# Patient Record
Sex: MALE | Race: BLACK OR AFRICAN AMERICAN
[De-identification: names, ages, dates, MRNs, and addresses within clinical notes are randomized per-mention and may not be internally consistent; named-entity substitution may affect disease eponyms.]

---

## 2019-04-05 ENCOUNTER — HOSPITAL ENCOUNTER (INPATIENT)
Dept: HOSPITAL 97 - ER | Age: 54
LOS: 5 days | Discharge: NURSING, REHAB OR LONG-TERM CARE | DRG: 540 | End: 2019-04-10
Attending: FAMILY MEDICINE | Admitting: INTERNAL MEDICINE
Payer: COMMERCIAL

## 2019-04-05 DIAGNOSIS — E11.610: ICD-10-CM

## 2019-04-05 DIAGNOSIS — F17.210: ICD-10-CM

## 2019-04-05 DIAGNOSIS — E11.51: ICD-10-CM

## 2019-04-05 DIAGNOSIS — M86.171: Primary | ICD-10-CM

## 2019-04-05 DIAGNOSIS — E11.621: ICD-10-CM

## 2019-04-05 DIAGNOSIS — Z79.84: ICD-10-CM

## 2019-04-05 DIAGNOSIS — B96.4: ICD-10-CM

## 2019-04-05 DIAGNOSIS — L97.413: ICD-10-CM

## 2019-04-05 DIAGNOSIS — Z89.512: ICD-10-CM

## 2019-04-05 DIAGNOSIS — E78.5: ICD-10-CM

## 2019-04-05 DIAGNOSIS — I10: ICD-10-CM

## 2019-04-05 DIAGNOSIS — M47.9: ICD-10-CM

## 2019-04-05 DIAGNOSIS — M48.061: ICD-10-CM

## 2019-04-05 DIAGNOSIS — Z79.4: ICD-10-CM

## 2019-04-05 DIAGNOSIS — Z89.421: ICD-10-CM

## 2019-04-05 LAB
ALBUMIN SERPL BCP-MCNC: 2.6 G/DL (ref 3.4–5)
ALP SERPL-CCNC: 90 U/L (ref 45–117)
ALT SERPL W P-5'-P-CCNC: 24 U/L (ref 12–78)
AST SERPL W P-5'-P-CCNC: 11 U/L (ref 15–37)
BUN BLD-MCNC: 14 MG/DL (ref 7–18)
CAOX CRY URNS QL MICRO: (no result)
CRP SERPL-MCNC: 24.9 MG/L (ref ?–3)
GLUCOSE SERPLBLD-MCNC: 236 MG/DL (ref 74–106)
HCT VFR BLD CALC: 39.1 % (ref 39.6–49)
INR BLD: 1.03
LYMPHOCYTES # SPEC AUTO: 2.1 K/UL (ref 0.7–4.9)
PMV BLD: 7.7 FL (ref 7.6–11.3)
POTASSIUM SERPL-SCNC: 3.7 MMOL/L (ref 3.5–5.1)
RBC # BLD: 4.3 M/UL (ref 4.33–5.43)
UA COMPLETE W REFLEX CULTURE PNL UR: (no result)

## 2019-04-05 PROCEDURE — 85025 COMPLETE CBC W/AUTO DIFF WBC: CPT

## 2019-04-05 PROCEDURE — 85652 RBC SED RATE AUTOMATED: CPT

## 2019-04-05 PROCEDURE — 83605 ASSAY OF LACTIC ACID: CPT

## 2019-04-05 PROCEDURE — 99285 EMERGENCY DEPT VISIT HI MDM: CPT

## 2019-04-05 PROCEDURE — 82550 ASSAY OF CK (CPK): CPT

## 2019-04-05 PROCEDURE — 81003 URINALYSIS AUTO W/O SCOPE: CPT

## 2019-04-05 PROCEDURE — 93925 LOWER EXTREMITY STUDY: CPT

## 2019-04-05 PROCEDURE — 84145 PROCALCITONIN (PCT): CPT

## 2019-04-05 PROCEDURE — 81015 MICROSCOPIC EXAM OF URINE: CPT

## 2019-04-05 PROCEDURE — 86140 C-REACTIVE PROTEIN: CPT

## 2019-04-05 PROCEDURE — 82962 GLUCOSE BLOOD TEST: CPT

## 2019-04-05 PROCEDURE — 36415 COLL VENOUS BLD VENIPUNCTURE: CPT

## 2019-04-05 PROCEDURE — 96365 THER/PROPH/DIAG IV INF INIT: CPT

## 2019-04-05 PROCEDURE — 97116 GAIT TRAINING THERAPY: CPT

## 2019-04-05 PROCEDURE — 87070 CULTURE OTHR SPECIMN AEROBIC: CPT

## 2019-04-05 PROCEDURE — 80076 HEPATIC FUNCTION PANEL: CPT

## 2019-04-05 PROCEDURE — 96361 HYDRATE IV INFUSION ADD-ON: CPT

## 2019-04-05 PROCEDURE — 96375 TX/PRO/DX INJ NEW DRUG ADDON: CPT

## 2019-04-05 PROCEDURE — 93971 EXTREMITY STUDY: CPT

## 2019-04-05 PROCEDURE — 87040 BLOOD CULTURE FOR BACTERIA: CPT

## 2019-04-05 PROCEDURE — 80202 ASSAY OF VANCOMYCIN: CPT

## 2019-04-05 PROCEDURE — 83036 HEMOGLOBIN GLYCOSYLATED A1C: CPT

## 2019-04-05 PROCEDURE — 72131 CT LUMBAR SPINE W/O DYE: CPT

## 2019-04-05 PROCEDURE — 80048 BASIC METABOLIC PNL TOTAL CA: CPT

## 2019-04-05 PROCEDURE — 87077 CULTURE AEROBIC IDENTIFY: CPT

## 2019-04-05 PROCEDURE — 85730 THROMBOPLASTIN TIME PARTIAL: CPT

## 2019-04-05 PROCEDURE — 83735 ASSAY OF MAGNESIUM: CPT

## 2019-04-05 PROCEDURE — 87086 URINE CULTURE/COLONY COUNT: CPT

## 2019-04-05 PROCEDURE — 84443 ASSAY THYROID STIM HORMONE: CPT

## 2019-04-05 PROCEDURE — 87088 URINE BACTERIA CULTURE: CPT

## 2019-04-05 PROCEDURE — 97162 PT EVAL MOD COMPLEX 30 MIN: CPT

## 2019-04-05 PROCEDURE — 96366 THER/PROPH/DIAG IV INF ADDON: CPT

## 2019-04-05 PROCEDURE — 93005 ELECTROCARDIOGRAM TRACING: CPT

## 2019-04-05 PROCEDURE — 71045 X-RAY EXAM CHEST 1 VIEW: CPT

## 2019-04-05 PROCEDURE — 84439 ASSAY OF FREE THYROXINE: CPT

## 2019-04-05 PROCEDURE — 87205 SMEAR GRAM STAIN: CPT

## 2019-04-05 PROCEDURE — 85610 PROTHROMBIN TIME: CPT

## 2019-04-05 PROCEDURE — 80061 LIPID PANEL: CPT

## 2019-04-05 PROCEDURE — 87186 SC STD MICRODIL/AGAR DIL: CPT

## 2019-04-05 RX ADMIN — HUMAN INSULIN SCH UNIT: 100 INJECTION, SOLUTION SUBCUTANEOUS at 23:31

## 2019-04-05 RX ADMIN — SODIUM CHLORIDE SCH MLS: 0.9 INJECTION, SOLUTION INTRAVENOUS at 23:30

## 2019-04-05 RX ADMIN — TRAMADOL HYDROCHLORIDE PRN MG: 50 TABLET, COATED ORAL at 23:38

## 2019-04-05 RX ADMIN — Medication SCH ML: at 23:31

## 2019-04-05 NOTE — RAD REPORT
EXAM DESCRIPTION:  RAD - Foot Right 3 View - 4/5/2019 4:29 pm

 

CLINICAL HISTORY:  Foot pain, pressure ulcer, foot wound

 

COMPARISON:  None.

 

FINDINGS:  Extensive degenerative and postsurgical changes are present throughout the foot. Right fir
st toe and distal first metatarsal have been resected along with the fourth and fifth toes and most o
f the fourth and fifth metatarsals. The middle and distal phalanges of the second and third toes have
 also been resected. There is degenerative change in subluxation at the second and third MTP joints. 
Midfoot degenerative changes are present. Flattened plantar arch is seen. Significant degenerative ch
gayatri at the tibiotalar joint space.

 

Soft tissue edema is present. No air or foreign body in the soft tissues.

 

No active bone destructive process seen. Osteomyelitis can be present prior to bone destruction.

 

IMPRESSION:  Patient has extensive degenerative and postsurgical changes to the foot. No active bone 
destructive process identifiable.

 

Osteomyelitis can exist prior to radiographic bone destruction.

 

Soft tissue edema is present with the baseline for the patient unknown. No air or foreign body.

## 2019-04-05 NOTE — ER
Nurse's Notes                                                                                     

 [facility]                                                                 

Name: Tomas Vanegas Jr                                                                            

Age: 54 yrs                                                                                       

Sex: Male                                                                                         

: 1965                                                                                   

MRN: [id number]                                                                                   

Arrival Date: 2019                                                                          

Time: 14:14                                                                                       

Account#: [id number]                                                                            

Bed 20                                                                                            

Private MD:                                                                                       

Diagnosis: Cellulitis of right lower limb-Foot;Low back pain                                      

                                                                                                  

Presentation:                                                                                     

                                                                                             

14:21 Presenting complaint: Patient states: my R leg is swollen and i have a wound on my R    hj  

      foot; wearing L leg prosthetic; denies fever and chills; denies SOB:. Transition of         

      care: patient was not received from another setting of care. Onset of symptoms was          

      2019. Risk Assessment: Do you want to hurt yourself or someone else? Patient      

      reports no desire to harm self or others. Initial Sepsis Screen: Does the patient meet      

      any 2 criteria? No. Patient's initial sepsis screen is negative. Does the patient have      

      a suspected source of infection? No. Patient's initial sepsis screen is negative. Care      

      prior to arrival: None.                                                                     

14:21 Method Of Arrival: Wheelchair                                                             

14:21 Acuity: GERBER 3                                                                           hj  

                                                                                                  

Triage Assessment:                                                                                

14:27 General: Appears in no apparent distress. uncomfortable, Behavior is calm, cooperative, hj  

      appropriate for age. Pain: Complains of pain in back and right leg. Musculoskeletal:        

      Amputation of left leg.                                                                     

                                                                                                  

Historical:                                                                                       

- Allergies:                                                                                      

14:26 No Known Allergies;                                                                     hj  

- Home Meds:                                                                                      

14:26 glimeperide-metformin 2.5- 500 mg 3 x a day [Active]; gabapentin 300 mg oral cap 1 cap  hj  

      twice a day [Active]; pioglitazone 15 mg oral tab 1 tab once daily [Active]; ezetimibe      

      oral oral 1 tab once daily [Active]; Plavix 75 mg Oral tab 1 tab once daily [Active];       

      atorvastatin 20 mg oral tab 1 tab once daily [Active]; tramadol 100 mg Oral BP25 1 cap      

      once daily [Active]; Novolin N 100 unit/mL Sub-Q susp [Active]; Nifedipine Oral             

      [Active]; Benicar oral oral [Active];                                                       

- PMHx:                                                                                           

14:26 Hyperlipidemia; Hypertension; Diabetes - NIDDM;                                         hj  

- PSHx:                                                                                           

14:26 L leg amputation;                                                                       hj  

                                                                                                  

- Immunization history:: Adult Immunizations up to date.                                          

- Social history:: Smoking status: Patient uses tobacco products, Patient uses alcohol.           

- Ebola Screening: : Patient negative for fever greater than or equal to 101.5 degrees            

  Fahrenheit, and additional compatible Ebola Virus Disease symptoms Patient denies               

  exposure to infectious person Patient denies travel to an Ebola-affected area in the            

  21 days before illness onset.                                                                   

                                                                                                  

                                                                                                  

Screenin:27 Abuse screen: Denies threats or abuse. Denies injuries from another. Nutritional        hj  

      screening: No deficits noted. Tuberculosis screening: No symptoms or risk factors           

      identified. Fall Risk None identified.                                                      

                                                                                                  

Assessment:                                                                                       

16:00 General: Appears in no apparent distress. comfortable, Behavior is calm, cooperative,   em  

      Reports feeling ill for 1-2 days, Denies fever. Pain: Complains of pain in back Pain        

      radiates to right leg Pain currently is 6 out of 10 on a pain scale. Neuro: Level of        

      Consciousness is awake, alert, obeys commands, Oriented to person, place, time,             

      situation. Cardiovascular: Heart tones S1 S2 present Capillary refill < 3 seconds           

      Patient's skin is warm and dry. Pulses are palpable in right dorsalis pedis artery.         

      Respiratory: Airway is patent Respiratory effort is even, unlabored, Respiratory            

      pattern is regular, symmetrical. GI: Abdomen is flat. Derm: Skin is pink, warm \T\ dry.     

      Wound noted ball of right foot. Musculoskeletal: Amputation of left shin, anterior          

      aspect of left ankle and dorsum of left foot. Range of motion: intact in all                

      extremities, Swelling present in right leg.                                                 

17:10 Reassessment: Patient appears in no apparent distress at this time. Patient and/or      em  

      family updated on plan of care and expected duration. Pain level reassessed. Patient is     

      alert, oriented x 3, equal unlabored respirations, skin warm/dry/pink. rates pain 4/10      

      Patient states feeling better.                                                              

18:00 Reassessment: pt reports pain after moving during CT, provider notified, new medication em  

      orders received.                                                                            

19:15 Reassessment: Patient appears in no apparent distress at this time. Patient and/or      cc3 

      family updated on plan of care and expected duration. Pain level reassessed. Patient is     

      alert, oriented x 3, equal unlabored respirations, skin warm/dry/pink. Received this        

      male patient from morning shift LVN Dre as a case of back pain and foot ulcer for         

      admission awaiting bed availability. With IV cannula gauge 20 at the right forearm          

      saline locked.                                                                              

20:30 Reassessment: Patient appears in no apparent distress at this time. Patient and/or      cc3 

      family updated on plan of care and expected duration. Pain level reassessed. Patient is     

      alert, oriented x 3, equal unlabored respirations, skin warm/dry/pink. Room available       

      in 208, called med-surg for report but was told that they will call back.                   

20:50 Reassessment: Report called and handed over to RN Irene Nation for continuity of    cc3 

      care and management.                                                                        

21:20 Reassessment: Patient appears in no apparent distress at this time. Patient and/or      cc3 

      family updated on plan of care and expected duration. Pain level reassessed. Patient is     

      alert, oriented x 3, equal unlabored respirations, skin warm/dry/pink. Patient left ER      

      for admission vitally stable by stretcher escorted by [facility] Emiliano.                      

                                                                                                  

Vital Signs:                                                                                      

14:27  / 90; Pulse 113; Resp 18; Temp 98.4(TE); Pulse Ox 98% on R/A; Weight 113.4 kg;   hj  

      Height 6 ft. 5 in. (195.58 cm); Pain 8/10;                                                  

16:00  / 80; Pulse 106; Resp 20; Pulse Ox 100% on R/A; Pain 8/10;                       em  

18:00  / 94; Pulse 94; Resp 18; Pulse Ox 99% on R/A; Pain 8/10;                         em  

19:15  / 88; Pulse 89; Resp 19 S; Pulse Ox 99% on R/A;                                  cc3 

20:21  / 86; Pulse 93; Resp 18 S; Pulse Ox 99% on R/A;                                  cc3 

21:00  / 87; Pulse 91; Resp 20 S; Pulse Ox 99% on R/A;                                  cc3 

14:27 Body Mass Index 29.65 (113.40 kg, 195.58 cm)                                              

                                                                                                  

ED Course:                                                                                        

14:14 Patient arrived in ED.                                                                  as  

14:22 Triage completed.                                                                       hj  

14:27 Arm band placed on right wrist.                                                         hj  

14:27 Patient has correct armband on for positive identification. Placed in gown. Bed in low  hj  

      position. Call light in reach. Side rails up X 1.                                           

15:24 Epps, Dre, LVN is Primary Nurse.                                                     em  

15:24 Kaveh Amaya PA is PHCP.                                                                cp  

15:24 Gary Frank MD is Attending Physician.                                              cp  

16:00 Initial lab(s) drawn, by me, sent to lab. Inserted saline lock: 20 gauge in right       em  

      forearm, using aseptic technique. Blood collected.                                          

16:29 Chest Single View XRAY In Process Unspecified.                                          EDMS

16:29 XRAY Foot RIGHT 3 View In Process Unspecified.                                          EDMS

16:32 EKG done, by EKG tech. reviewed by Kaveh SNEED.                                       sm3 

16:59 US Extremity Venous Unilateral Ltd In Process Unspecified.                              EDMS

17:56 CT Lumbar Spine Wo Con: pain In Process Unspecified.                                    EDMS

18:30 Dressings: Kerlix X 1; right foot.                                                      em  

19:06 Miguel Ortiz DO is Hospitalizing Provider.                                           cp  

20:27 Primary Nurse role handed off by Dre Epps LVN                                      ed1 

20:50 No provider procedures requiring assistance completed. Patient admitted, IV remains in  cc3 

      place.                                                                                      

                                                                                                  

Administered Medications:                                                                         

16:20 Drug: morphine 4 mg Route: IVP; Site: right forearm;                                    hb  

17:10 Follow up: Response: No adverse reaction; Pain is decreased                             em  

16:20 Drug: Zofran 4 mg Route: IVP; Site: right forearm;                                      hb  

17:10 Follow up: Response: No adverse reaction                                                em  

16:34 Drug: NS 0.9% 1000 ml Route: IV; Rate: 1 bolus; Site: right forearm;                    em  

18:13 Follow up: IV Status: Completed infusion; IV Intake: 1000ml                             em  

18:15 Drug: Zofran 4 mg Route: IVP; Site: right forearm;                                      em  

19:15 Follow up: Response: No adverse reaction; Nausea is decreased                           cc3 

18:15 Drug: morphine 4 mg Route: IVP; Site: right forearm;                                    em  

19:15 Follow up: Response: No adverse reaction; Pain is decreased                             cc3 

19:15 Drug: vancoMYCIN 1 grams Route: IVPB; Infused Over: 2 hrs; Site: right forearm;         cc3 

21:15 Follow up: Response: No adverse reaction; IV Status: Completed infusion; IV Intake:     cc3 

      250ml                                                                                       

                                                                                                  

                                                                                                  

Intake:                                                                                           

18:13 IV: 1000ml; Total: 1000ml.                                                              em  

21:15 IV: 250ml; Total: 1250ml.                                                               cc3 

                                                                                                  

Outcome:                                                                                          

19:07 Decision to Hospitalize by Provider.                                                    cp  

20:50 Admitted to Med/surg accompanied by tech, via stretcher, room 208, with chart, Report   cc3 

      called to  Irene Nation RN                                                             

20:50 Condition: stable                                                                           

20:50 Instructed on the need for admit, Demonstrated understanding of instructions.               

21:23 Patient left the ED.                                                                    cc3 

                                                                                                  

Signatures:                                                                                       

Dispatcher [employer]                           EDMS                                                 

Dre Epps, LVN                       LVN  Marilyn Crain Erika, RN                        RN   ed1                                                  

Simone Light RN                      RN   hj                                                   

Kaveh Amaya PA PA cp Baxter, Heather, JUANITO                     RN                                                      

Luci Mc                              sm3                                                  

Taylor Bran                             cc3                                                  

                                                                                                  

Corrections: (The following items were deleted from the chart)                                    

14:29 14:27 Pulse 113bpm; Resp 18bpm; Pulse Ox 98% RA; Temp 98.4F Temporal; 113.4 kg; Height  hj  

      6 ft. 5 in.; BMI: 29.6; Pain 8/10; hj                                                       

                                                                                                  

**************************************************************************************************

## 2019-04-05 NOTE — RAD REPORT
EXAM DESCRIPTION:  CT - Spine Lumbar Wo Con - 4/5/2019 5:56 pm

 

CLINICAL HISTORY:  Back pain, radiculopathy

 

COMPARISON:  None.

 

TECHNIQUE:  Thin section axial imaging of the lumbar spine was performed.  Sagittal and coronal recon
struction images were generated and reviewed.

 

All CT scans are performed using dose optimization technique as appropriate and may include automated
 exposure control or mA/KV adjustment according to patient size.

 

FINDINGS:  No acute compression fracture identifiable. Right lateral tilt of the lumbar spine may be 
positioning artifact. No AP alignment abnormality. Subcortical degenerative cystic changes are presen
t abutting the endplates at L4-5. This is not seen as aggressive or pathologic in etiology. No pathol
ogic bone process seen. No paraspinal soft tissue mass.

 

T11-12: No herniation or disc bulge. No canal or significant foraminal encroachment. Facet and endpla
te degenerative spurring present.

T12-L1: No herniation or significant disc bulge. Facet degenerative change and endplate spurring. No 
canal or foramen stenosis.

L1-2: No herniation or significant disc bulge. No canal or foramen significant stenosis. Facet and en
dplate degenerative changes present.

L2-3: Loss in disc height with degenerative disc disease evident. Bulging disc material is present mo
re prominent into the right exit foramen where there is significant right foraminal stenosis. Central
 canal is borderline stenotic. Prominent facet degenerative changes are present. Prominent endplate s
purring changes are present.

L3-4: Disc bulge without herniation. Mild right foraminal stenosis. Facet degenerative change and end
plate spurring noted.

L4-5: Loss in disc height with very advanced degenerative change to the endplates. Bulging of disc ma
terial across the central canal and into each exit foramen noted. No central spinal stenosis. Mild bi
lateral foraminal stenosis seen.

L5-S1 level. No herniation, disc bulge, canal stenosis or foraminal encroachment. Facet degenerative 
changes are present.

 

No pars defects seen.

 

IMPRESSION:  Degenerative changes are present in the lumbar spine as detailed. Findings are most pron
ounced at L4-5.

 

No fracture or acute bone findings seen.

 

Central spinal stenosis is not identified. Central canal detail is inherently limited. Canal is borde
rline stenotic at L2-3.

## 2019-04-05 NOTE — EDPHYS
Physician Documentation                                                                           

 [facility]                                                                 

Name: Tomas Vanegas Jr                                                                            

Age: 54 yrs                                                                                       

Sex: Male                                                                                         

: 1965                                                                                   

MRN: [id number]                                                                                   

Arrival Date: 2019                                                                          

Time: 14:14                                                                                       

Account#: [id number]                                                                            

Bed 20                                                                                            

Private MD:                                                                                       

ED Physician Gary Frank                                                                       

HPI:                                                                                              

                                                                                             

15:50 This 54 yrs old Black Male presents to ER via Wheelchair with complaints of Back Pain,  cp  

      Foot Pain - Ulcer.                                                                          

15:50 The patient presents with pressure ulcer. The complaints affect the ball of right foot. cp  

15:50 Onset: The symptoms/episode began/occurred gradually. Associated signs and symptoms:    cp  

      Pertinent positives: swelling, of the right leg, Pertinent negatives fever. The patient     

      presents with pain that is acute, with no known mechanism of injury. The symptoms are       

      located in the low back. The pain does not radiate. Associated signs and symptoms:          

      Pertinent negatives: abdominal pain, chest pain, constipation, dysuria, fever,              

      incontinence, urinary retention, weakness. The problem was sustained from unknown           

      cause. Severity of symptoms: in the emergency department the symptoms are unchanged,        

      despite home interventions. Patient reports he was referred to ED by DR Barber for           

      pressure ulcer wound of right foot.                                                         

                                                                                                  

Historical:                                                                                       

- Allergies:                                                                                      

14:26 No Known Allergies;                                                                     hj  

- Home Meds:                                                                                      

14:26 glimeperide-metformin 2.5- 500 mg 3 x a day [Active]; gabapentin 300 mg oral cap 1 cap  hj  

      twice a day [Active]; pioglitazone 15 mg oral tab 1 tab once daily [Active]; ezetimibe      

      oral oral 1 tab once daily [Active]; Plavix 75 mg Oral tab 1 tab once daily [Active];       

      atorvastatin 20 mg oral tab 1 tab once daily [Active]; tramadol 100 mg Oral BP25 1 cap      

      once daily [Active]; Novolin N 100 unit/mL Sub-Q susp [Active]; Nifedipine Oral             

      [Active]; Benicar oral oral [Active];                                                       

- PMHx:                                                                                           

14:26 Hyperlipidemia; Hypertension; Diabetes - NIDDM;                                         hj  

- PSHx:                                                                                           

14:26 L leg amputation;                                                                       hj  

                                                                                                  

- Immunization history:: Adult Immunizations up to date.                                          

- Social history:: Smoking status: Patient uses tobacco products, Patient uses alcohol.           

- Ebola Screening: : Patient negative for fever greater than or equal to 101.5 degrees            

  Fahrenheit, and additional compatible Ebola Virus Disease symptoms Patient denies               

  exposure to infectious person Patient denies travel to an Ebola-affected area in the            

  21 days before illness onset.                                                                   

                                                                                                  

                                                                                                  

ROS:                                                                                              

16:00 Constitutional: Negative for fever.                                                     cp  

16:00 All other systems are negative.                                                         cp  

                                                                                                  

Exam:                                                                                             

16:10 Constitutional: The patient appears in no acute distress, alert, awake,                 cp  

      non-diaphoretic, non-toxic, well developed, well nourished.                                 

16:10 Head/Face:  Normocephalic, atraumatic. Eyes:  Pupils equal round and reactive to light, cp  

      extra-ocular motions intact.  Lids and lashes normal.  Conjunctiva and sclera are           

      non-icteric and not injected.  Cornea within normal limits.  Periorbital areas with no      

      swelling, redness, or edema. ENT:  Nares patent. No nasal discharge, no septal              

      abnormalities noted.  Tympanic membranes are normal and external auditory canals are        

      clear.  Oropharynx with no redness, swelling, or masses, exudates, or evidence of           

      obstruction, uvula midline.  Mucous membranes moist. Neck:  Trachea midline, no             

      thyromegaly or masses palpated, and no cervical lymphadenopathy.  Supple, full range of     

      motion without nuchal rigidity, or vertebral point tenderness.  No Meningismus.             

      Chest/axilla:  Normal chest wall appearance and motion.  Nontender with no deformity.       

      No lesions are appreciated.                                                                 

16:10 Cardiovascular: Rate: tachycardic, Rhythm: regular, Pulses: Pulses are 1+ in right          

      dorsalis pedis artery. Edema: moderate noted to right lower leg and foot, JVD: is not       

      appreciated.                                                                                

16:10 Respiratory: the patient does not display signs of respiratory distress,  Respirations:     

      normal, no use of accessory muscles, no retractions, no splinting, no tachypnea,            

      labored breathing, is not present, Breath sounds: are clear throughout, no decreased        

      breath sounds, no stridor, no wheezing.                                                     

16:10 Abdomen/GI: Inspection: abdomen appears normal, Palpation: abdomen is soft and              

      non-tender, in all quadrants.                                                               

16:10 Back: pain, that is moderate, of the  lumbar area, ROM is painful, with all movement,       

      CVA tenderness, is absent, vertebral tenderness, is appreciated at L3, L4 and L5.           

16:10 Musculoskeletal/extremity: Extremities: grossly normal except: noted in the right foot:     

      deformity, pressure ulcer on plantar surface with erythema and swelling, scant drainage     

      noted, left BKA.                                                                            

16:10 Skin: right groin enlarged lymph node.                                                      

16:10 Neuro: Orientation: to person, place \T\ time. Mentation: is normal, Cerebellar function:   

      is grossly normal, Motor: is normal, Sensation: light touch is decreased in the  right      

      foot.                                                                                       

16:35 ECG was reviewed by the Attending Physician.                                              

                                                                                                  

Vital Signs:                                                                                      

14:27  / 90; Pulse 113; Resp 18; Temp 98.4(TE); Pulse Ox 98% on R/A; Weight 113.4 kg;   hj  

      Height 6 ft. 5 in. (195.58 cm); Pain 8/10;                                                  

16:00  / 80; Pulse 106; Resp 20; Pulse Ox 100% on R/A; Pain 8/10;                       em  

18:00  / 94; Pulse 94; Resp 18; Pulse Ox 99% on R/A; Pain 8/10;                         em  

19:15  / 88; Pulse 89; Resp 19 S; Pulse Ox 99% on R/A;                                  cc3 

20:21  / 86; Pulse 93; Resp 18 S; Pulse Ox 99% on R/A;                                  cc3 

21:00  / 87; Pulse 91; Resp 20 S; Pulse Ox 99% on R/A;                                  cc3 

14:27 Body Mass Index 29.65 (113.40 kg, 195.58 cm)                                            hj  

                                                                                                  

MDM:                                                                                              

15:27 Patient medically screened.                                                             cp  

18:31 Physician consultation: Miguel Ortiz DO was called at 18:31, was contacted at 18:31,     

      regarding consult, patient's condition, and will see patient in ED, shortly.                

19:05 Physician consultation: Miguel Ortiz DO in the emergency department to see patient at  cp  

      18:40, will admit for IV antibiotics.                                                       

                                                                                                  

                                                                                             

15:55 Order name: C-Reactive Protein; Complete Time: 17:17                                      

                                                                                             

17:17 Interpretation: Abnormal: C-REACTIVE PROT 24.90.                                          

                                                                                             

15:55 Order name: Sed Rate; Complete Time: 17:17                                              cp  

                                                                                             

17:17 Interpretation: Abnormal: SED 76.                                                       cp  

                                                                                             

15:55 Order name: Wound Culture                                                               cp  

                                                                                             

15:55 Order name: Urine Culture                                                               cp  

                                                                                             

15:55 Order name: Urine Microscopic Only                                                      cp  

04/05                                                                                             

15:55 Order name: Basic Metabolic Panel; Complete Time: 17:17                                 cp  

04/05                                                                                             

18:29 Interpretation: Normal except: GLUC 236.                                                cp  

04/05                                                                                             

15:55 Order name: Blood Culture Adult (2)                                                     cp  

04/05                                                                                             

15:55 Order name: CBC with Diff; Complete Time: 17:17                                         cp  

04/05                                                                                             

17:17 Interpretation: Normal except: RBC 4.30; HGB 13.2; HCT 39.1.                            cp  

04/05                                                                                             

15:55 Order name: CPK; Complete Time: 17:17                                                   cp  

04/05                                                                                             

15:55 Order name: Lactate; Complete Time: 17:17                                               cp  

04/05                                                                                             

15:55 Order name: LFT's; Complete Time: 17:17                                                 cp  

04/                                                                                             

15:55 Order name: Procalcitonin; Complete Time: 17:17                                         cp  

04                                                                                             

15:55 Order name: Protime (+inr); Complete Time: 17:17                                        cp  

04                                                                                             

15:55 Order name: Ptt, Activated; Complete Time: 17:17                                        cp  

04                                                                                             

15:55 Order name: US Extremity Venous Unilateral Ltd; Complete Time: 18:28                    cp  

04                                                                                             

15:55 Order name: Chest Single View XRAY; Complete Time: 17:17                                cp  

04                                                                                             

15:55 Order name: Accucheck; Complete Time: 16:20                                             cp  

0405                                                                                             

15:55 Order name: Cardiac monitoring; Complete Time: 16:21                                    cp  

05                                                                                             

15:55 Order name: EKG - Nurse/Tech; Complete Time: 16:20                                      cp  

0405                                                                                             

15:55 Order name: IV Saline Lock - Large Bore; Complete Time: 16:21                           cp  

0405                                                                                             

15:55 Order name: XRAY Foot RIGHT 3 View; Complete Time: 17:17                                cp  

0405                                                                                             

17:19 Order name: CT Lumbar Spine Wo Con: pain; Complete Time: 18:28                          cp  

05                                                                                             

17:59 Order name: EKG Electrocardiogram                                                       EDMS

                                                                                             

19:54 Order name: Urine Dipstick--Ancillary (enter results)                                   cm6 

                                                                                             

15:55 Order name: Labs collected and sent; Complete Time: 16:21                               cp  

0405                                                                                             

15:55 Order name: O2 Per Protocol; Complete Time: 16:21                                       cp  

                                                                                             

15:55 Order name: O2 Sat Monitoring; Complete Time: 16:21                                     cp  

                                                                                             

15:55 Order name: Urine Dipstick-Ancillary (obtain specimen); Complete Time: 20:12            cp  

                                                                                                  

EC:35 Rate is 103 beats/min. Rhythm is regular. AK interval is normal. QRS interval is        cp  

      normal. QT interval is normal. Interpreted by me. Reviewed by me.                           

                                                                                                  

Administered Medications:                                                                         

16:20 Drug: morphine 4 mg Route: IVP; Site: right forearm;                                    hb  

17:10 Follow up: Response: No adverse reaction; Pain is decreased                             em  

16:20 Drug: Zofran 4 mg Route: IVP; Site: right forearm;                                      hb  

17:10 Follow up: Response: No adverse reaction                                                em  

16:34 Drug: NS 0.9% 1000 ml Route: IV; Rate: 1 bolus; Site: right forearm;                    em  

18:13 Follow up: IV Status: Completed infusion; IV Intake: 1000ml                             em  

18:15 Drug: Zofran 4 mg Route: IVP; Site: right forearm;                                      em  

19:15 Follow up: Response: No adverse reaction; Nausea is decreased                           cc3 

18:15 Drug: morphine 4 mg Route: IVP; Site: right forearm;                                    em  

19:15 Follow up: Response: No adverse reaction; Pain is decreased                             cc3 

19:15 Drug: vancoMYCIN 1 grams Route: IVPB; Infused Over: 2 hrs; Site: right forearm;         cc3 

21:15 Follow up: Response: No adverse reaction; IV Status: Completed infusion; IV Intake:     cc3 

      250ml                                                                                       

                                                                                                  

                                                                                                  

Disposition:                                                                                      

19 19:07 Hospitalization ordered by Miguel Ortiz for Inpatient Admission. Preliminary     

  diagnosis are Cellulitis of right lower limb - Foot, Low back pain.                             

- Bed requested for Telemetry/MedSurg (Inpatient).                                                

- Status is Inpatient Admission.                                                              cc3 

- Condition is Stable.                                                                            

- Problem is an ongoing problem.                                                                  

- Symptoms have improved.                                                                         

UTI on Admission? No                                                                              

                                                                                                  

                                                                                                  

                                                                                                  

Addendum:                                                                                         

2019                                                                                        

     07:26 Co-signature as Attending Physician, Gary Frank MD I agree with the assessment and   k
dr

           plan of care.                                                                          

                                                                                                  

Signatures:                                                                                       

Dispatcher [employer]                           Gary Goodrich MD MD kdr Munoz, Edgar, LVN                       LVN  em                                                   

Simone Light RN                      RN   hj                                                   

Kaveh Amaya PA                         PA   Allyn Fields RN RN                                                      

Tamara Mahoney RN                     RN                                                      

Taylor Bran                             cc3                                                  

                                                                                                  

Corrections: (The following items were deleted from the chart)                                    

                                                                                             

20:25 19:07 Hospitalization Ordered by Miguel Ortiz DO for Inpatient Admission. Preliminary  cg  

      diagnosis is Cellulitis of right lower limb - Foot; Low back pain. Bed requested for        

      Telemetry/MedSurg (Inpatient). Status is Inpatient Admission. Condition is Stable.          

      Problem is an ongoing problem. Symptoms have improved. UTI on Admission? No. cp             

21:23 20:25 2019 19:07 Hospitalization Ordered by Miguel Ortiz DO for Inpatient        cc3 

      Admission. Preliminary diagnosis is Cellulitis of right lower limb - Foot; Low back         

      pain. Bed requested for Telemetry/MedSurg (Inpatient). Status is Inpatient Admission.       

      Condition is Stable. Problem is an ongoing problem. Symptoms have improved. UTI on          

      Admission? No. cg                                                                           

                                                                                                  

**************************************************************************************************

## 2019-04-05 NOTE — RAD REPORT
EXAM DESCRIPTION:  RAD - Chest Single View - 4/5/2019 4:29 pm

 

CLINICAL HISTORY:  Shortness of breath, preop chest wound treatment

 

COMPARISON:  None.

 

TECHNIQUE:  AP portable chest image was obtained 1616 hours .

 

FINDINGS:  Lungs are clear. Heart and vasculature are normal. No measurable pleural effusion and no p
neumothorax. No acute bony abnormality seen. No acute aortic findings suspected.

 

IMPRESSION:  No acute cardiopulmonary process.

## 2019-04-05 NOTE — RAD REPORT
EXAM DESCRIPTION:  US - Extremity Venous Uni Ltd - 4/5/2019 4:59 pm

 

CLINICAL HISTORY:  Right leg pain and swelling

 

COMPARISON:  None.

 

TECHNIQUE:  Real-time sonographic evaluation of the right lower extremity deep venous systems was per
formed.

 

FINDINGS:  Normal compressibility, flow augmentation, phasic flow and spontaneous flow are identified
 in the right lower extremity common femoral, superficial femoral, popliteal and posterior tibial vei
ns. No intraluminal filling defects seen.

 

IMPRESSION:  No DVT in the right lower extremity.

## 2019-04-06 LAB
BUN BLD-MCNC: 11 MG/DL (ref 7–18)
GLUCOSE SERPLBLD-MCNC: 192 MG/DL (ref 74–106)
HCT VFR BLD CALC: 36.8 % (ref 39.6–49)
LYMPHOCYTES # SPEC AUTO: 2.3 K/UL (ref 0.7–4.9)
MAGNESIUM SERPL-MCNC: 2 MG/DL (ref 1.8–2.4)
PMV BLD: 7.4 FL (ref 7.6–11.3)
POTASSIUM SERPL-SCNC: 3.8 MMOL/L (ref 3.5–5.1)
RBC # BLD: 4 M/UL (ref 4.33–5.43)

## 2019-04-06 RX ADMIN — ASPIRIN SCH MG: 81 TABLET, COATED ORAL at 08:42

## 2019-04-06 RX ADMIN — ATORVASTATIN CALCIUM SCH MG: 20 TABLET, FILM COATED ORAL at 21:12

## 2019-04-06 RX ADMIN — HUMAN INSULIN SCH UNIT: 100 INJECTION, SOLUTION SUBCUTANEOUS at 17:15

## 2019-04-06 RX ADMIN — TRAMADOL HYDROCHLORIDE PRN MG: 50 TABLET, COATED ORAL at 17:30

## 2019-04-06 RX ADMIN — SODIUM CHLORIDE SCH: 0.9 INJECTION, SOLUTION INTRAVENOUS at 07:30

## 2019-04-06 RX ADMIN — HUMAN INSULIN SCH: 100 INJECTION, SOLUTION SUBCUTANEOUS at 07:30

## 2019-04-06 RX ADMIN — PIPERACILLIN SODIUM AND TAZOBACTAM SODIUM SCH MLS: 3; .375 INJECTION, POWDER, LYOPHILIZED, FOR SOLUTION INTRAVENOUS at 05:18

## 2019-04-06 RX ADMIN — CLOPIDOGREL BISULFATE SCH MG: 75 TABLET, FILM COATED ORAL at 08:43

## 2019-04-06 RX ADMIN — TRAMADOL HYDROCHLORIDE PRN MG: 50 TABLET, COATED ORAL at 05:19

## 2019-04-06 RX ADMIN — CEFEPIME SCH MLS: 1 INJECTION, POWDER, FOR SOLUTION INTRAMUSCULAR; INTRAVENOUS at 21:12

## 2019-04-06 RX ADMIN — SODIUM CHLORIDE SCH MLS: 9 INJECTION, SOLUTION INTRAVENOUS at 21:12

## 2019-04-06 RX ADMIN — VALSARTAN SCH MG: 80 TABLET ORAL at 08:42

## 2019-04-06 RX ADMIN — GABAPENTIN SCH MG: 300 CAPSULE ORAL at 13:12

## 2019-04-06 RX ADMIN — ENOXAPARIN SODIUM SCH MG: 40 INJECTION SUBCUTANEOUS at 17:16

## 2019-04-06 RX ADMIN — SODIUM CHLORIDE SCH MLS: 9 INJECTION, SOLUTION INTRAVENOUS at 10:26

## 2019-04-06 RX ADMIN — EZETIMIBE SCH MG: 10 TABLET ORAL at 08:42

## 2019-04-06 RX ADMIN — NIFEDIPINE SCH MG: 90 TABLET, FILM COATED, EXTENDED RELEASE ORAL at 08:42

## 2019-04-06 RX ADMIN — Medication SCH: at 08:43

## 2019-04-06 RX ADMIN — CEFEPIME SCH MLS: 1 INJECTION, POWDER, FOR SOLUTION INTRAMUSCULAR; INTRAVENOUS at 10:26

## 2019-04-06 RX ADMIN — NICOTINE SCH MG: 21 PATCH TRANSDERMAL at 05:18

## 2019-04-06 RX ADMIN — GABAPENTIN SCH MG: 300 CAPSULE ORAL at 08:41

## 2019-04-06 RX ADMIN — HUMAN INSULIN SCH UNIT: 100 INJECTION, SOLUTION SUBCUTANEOUS at 11:31

## 2019-04-06 RX ADMIN — HUMAN INSULIN SCH UNITS: 100 INJECTION, SUSPENSION SUBCUTANEOUS at 21:13

## 2019-04-06 RX ADMIN — Medication SCH ML: at 21:14

## 2019-04-06 RX ADMIN — GABAPENTIN SCH MG: 300 CAPSULE ORAL at 21:12

## 2019-04-06 RX ADMIN — HUMAN INSULIN SCH UNIT: 100 INJECTION, SOLUTION SUBCUTANEOUS at 21:13

## 2019-04-06 RX ADMIN — PIPERACILLIN SODIUM AND TAZOBACTAM SODIUM SCH MLS: 3; .375 INJECTION, POWDER, LYOPHILIZED, FOR SOLUTION INTRAVENOUS at 00:46

## 2019-04-06 RX ADMIN — TRAMADOL HYDROCHLORIDE PRN MG: 50 TABLET, COATED ORAL at 11:26

## 2019-04-06 NOTE — P.PN
Subjective


Date of Service: 04/06/19


Primary Care Provider: Dr. Kennedy([place]); Podiatry-Dr. Barber


Chief Complaint: diabetic foot ulcer infection


Subjective: Doing well





Physical Examination





- Vital Signs


Temperature: 97.6 F


Blood Pressure: 156/89


Pulse: 88


Respirations: 14


Pulse Ox (%): 98





- Physical Exam


General: Alert, In no apparent distress, Oriented x3, Cooperative


HEENT: Atraumatic


Neck: Supple


Respiratory: Clear to auscultation bilaterally, Normal air movement


Cardiovascular: Normal pulses, Regular rate/rhythm


Gastrointestinal: Normal bowel sounds, Soft and benign, Non-distended


Integumentary: Other (Erythema and swelling to the right lower extremity 

improved.  2 ulcers to the foot noted. History of left BKA)


Neurological: Normal speech, Normal strength at 5/5 x4 extr, Normal tone, 

Normal affect





- Studies


Laboratory Data (last 24 hrs)





04/05/19 16:00: PT 12.1, INR 1.03, APTT 47.1 H


04/05/19 16:00: WBC 10.2, Hgb 13.2 L, Hct 39.1 L, Plt Count 318


04/05/19 16:00: Sodium 138, Potassium 3.7, BUN 14, Creatinine 0.88, Glucose 236 

H, Total Bilirubin 0.2, AST 11 L, ALT 24, Alkaline Phosphatase 90





Medications List Reviewed: Yes





Assessment & Plan


Discharge Plan: Home


Plan to discharge in: 48 Hours


Physician Review Additional Text: 





Impression:


Right lower extremity cellulitis with diabetic foot ulcers, suspect 

osteomyelitis, complicated with Charcot arthropathy


Diabetes mellitus type 2, insulin dependent


Hypertension


Hyperlipidemia


Tobacco abuse


Peripheral vascular disease


History left BKA





Plan:


Right lower extremity cellulitis with diabetic foot ulcers, suspect 

osteomyelitis, complicated with Charcot arthropathy:  Continue IV antibiotic 

therapy.  Will adjust medication to continue vancomycin and cefepime.  Will 

have pharmacy monitor and adjust closely.  Will obtain arterial Doppler to 

evaluate for PVD.  Will obtain MRI of foot to rule out osteomyelitis.  Will 

discuss case with his podiatrist.  If the MRI negative anticipate discharge on 

Monday with oral antibiotics.  If MRI positive then the patient will require 

PICC line with long-term IV antibiotic therapy.  If he requires long-term IV 

antibiotic therapy he prefers to do this at home.  Continue DVT prophylaxis.  

Will provide medication for pain. Continue to monitor and adjust appropriately.


Diabetes mellitus type 2, insulin dependent:  Will check A1c.  Continue basal 

insulin.  Will monitor and adjust appropriately.


Hypertension:  Restart home medication.  Will monitor and adjust appropriately.


Hyperlipidemia:  Restart home medication.


Tobacco abuse:  Continue to address tobacco cessation.  Will provide nicotine 

patch if needed.


Peripheral vascular disease:  Suspect peripheral vascular disease.  Will obtain 

Arterial Doppler to further evaluate.


History left BKA:  Will have physical therapy ambulate.  Patient has prosthesis.


Time Spent Managing Pts Care (In Minutes): 55

## 2019-04-06 NOTE — RAD REPORT
EXAM DESCRIPTION:  US - Lower Extremity Arterial Bilat - 4/6/2019 8:14 pm

 

CLINICAL HISTORY:  Peripheral vascular disease

 

COMPARISON:  None.

 

TECHNIQUE:  Waveforms and velocity were obtained along the length of each lower extremity. Visual ins
pection of the lower extremity arterial tree performed.

 

FINDINGS:  Calcifications are identified in the lower extremity arterial tree. No focal flow restrict
ing lesion or occlusion.

 

Triphasic waveform pattern seen in the right common femoral and superficial femoral artery is with fo
hao plaquing changes seen. Popliteal artery to dorsalis pedis artery shows a biphasic waveform patter
n.

 

Triphasic waveform pattern seen in the left common femoral, superficial femoral and popliteal Patient
 is a below-knee amputee.

 

No suspicious velocity value.

 

IMPRESSION:  Bilateral lower extremity peripheral arterial disease is present mild in degree. No occl
usion or flow restricting lesion.

## 2019-04-07 LAB
BUN BLD-MCNC: 7 MG/DL (ref 7–18)
GLUCOSE SERPLBLD-MCNC: 90 MG/DL (ref 74–106)
HCT VFR BLD CALC: 36.2 % (ref 39.6–49)
HDLC SERPL-MCNC: 48 MG/DL (ref 40–60)
LDLC SERPL CALC-MCNC: 29 MG/DL (ref ?–130)
LYMPHOCYTES # SPEC AUTO: 1.5 K/UL (ref 0.7–4.9)
MAGNESIUM SERPL-MCNC: 2 MG/DL (ref 1.8–2.4)
PMV BLD: 7.4 FL (ref 7.6–11.3)
POTASSIUM SERPL-SCNC: 3.8 MMOL/L (ref 3.5–5.1)
RBC # BLD: 3.94 M/UL (ref 4.33–5.43)
TSH SERPL DL<=0.05 MIU/L-ACNC: 1.65 UIU/ML (ref 0.36–3.74)

## 2019-04-07 RX ADMIN — GABAPENTIN SCH MG: 300 CAPSULE ORAL at 13:19

## 2019-04-07 RX ADMIN — EZETIMIBE SCH MG: 10 TABLET ORAL at 08:51

## 2019-04-07 RX ADMIN — GABAPENTIN SCH MG: 300 CAPSULE ORAL at 08:51

## 2019-04-07 RX ADMIN — VALSARTAN SCH MG: 80 TABLET ORAL at 08:52

## 2019-04-07 RX ADMIN — NIFEDIPINE SCH MG: 90 TABLET, FILM COATED, EXTENDED RELEASE ORAL at 08:53

## 2019-04-07 RX ADMIN — HUMAN INSULIN SCH: 100 INJECTION, SOLUTION SUBCUTANEOUS at 16:30

## 2019-04-07 RX ADMIN — ASPIRIN SCH MG: 81 TABLET, COATED ORAL at 08:51

## 2019-04-07 RX ADMIN — CLOPIDOGREL BISULFATE SCH MG: 75 TABLET, FILM COATED ORAL at 08:52

## 2019-04-07 RX ADMIN — Medication SCH ML: at 08:54

## 2019-04-07 RX ADMIN — SODIUM CHLORIDE SCH MLS: 9 INJECTION, SOLUTION INTRAVENOUS at 21:20

## 2019-04-07 RX ADMIN — HUMAN INSULIN SCH: 100 INJECTION, SOLUTION SUBCUTANEOUS at 07:30

## 2019-04-07 RX ADMIN — GABAPENTIN SCH MG: 300 CAPSULE ORAL at 21:21

## 2019-04-07 RX ADMIN — ATORVASTATIN CALCIUM SCH MG: 20 TABLET, FILM COATED ORAL at 21:21

## 2019-04-07 RX ADMIN — NICOTINE SCH MG: 21 PATCH TRANSDERMAL at 08:52

## 2019-04-07 RX ADMIN — TRAMADOL HYDROCHLORIDE PRN MG: 50 TABLET, COATED ORAL at 04:23

## 2019-04-07 RX ADMIN — CEFEPIME SCH MLS: 1 INJECTION, POWDER, FOR SOLUTION INTRAMUSCULAR; INTRAVENOUS at 21:20

## 2019-04-07 RX ADMIN — CEFEPIME SCH MLS: 1 INJECTION, POWDER, FOR SOLUTION INTRAMUSCULAR; INTRAVENOUS at 08:51

## 2019-04-07 RX ADMIN — TRAMADOL HYDROCHLORIDE PRN MG: 50 TABLET, COATED ORAL at 13:19

## 2019-04-07 RX ADMIN — Medication SCH ML: at 21:21

## 2019-04-07 RX ADMIN — HUMAN INSULIN SCH UNITS: 100 INJECTION, SUSPENSION SUBCUTANEOUS at 21:20

## 2019-04-07 RX ADMIN — SODIUM CHLORIDE SCH MLS: 9 INJECTION, SOLUTION INTRAVENOUS at 08:49

## 2019-04-07 RX ADMIN — ENOXAPARIN SODIUM SCH MG: 40 INJECTION SUBCUTANEOUS at 16:37

## 2019-04-07 RX ADMIN — HUMAN INSULIN SCH UNIT: 100 INJECTION, SOLUTION SUBCUTANEOUS at 21:21

## 2019-04-07 RX ADMIN — HUMAN INSULIN SCH: 100 INJECTION, SOLUTION SUBCUTANEOUS at 11:30

## 2019-04-07 NOTE — P.PN
Subjective


Date of Service: 04/07/19


Primary Care Provider: Dr. Kennedy([place]); Podiatry-Dr. Barber


Chief Complaint: diabetic foot ulcer infection


Subjective: Improving





Physical Examination





- Vital Signs


Temperature: 97.2 F


Blood Pressure: 144/88


Pulse: 84


Respirations: 20


Pulse Ox (%): 99





- Physical Exam


General: Alert, In no apparent distress, Oriented x3, Cooperative


HEENT: Atraumatic


Neck: Supple


Respiratory: Clear to auscultation bilaterally, Normal air movement


Cardiovascular: Normal pulses, Regular rate/rhythm


Gastrointestinal: Normal bowel sounds, Soft and benign, Non-distended, No masses

, No rebound, No guarding


Integumentary: Other (Right lower extremity shows improvement.  Swelling 

improved.  Foot bandaged.  No significant erythema or warmth to the ankle area.)


Neurological: Normal speech, Normal strength at 5/5 x4 extr, Normal tone, 

Normal affect





- Studies


Microbiology Data (last 24 hrs): 








04/05/19 16:00   Wound - Right Foot   Gram Stain - Final





Medications List Reviewed: Yes





Assessment & Plan


Discharge Plan: Home


Plan to discharge in: 24 Hours


Physician Review Additional Text: 





Impression:


Right lower extremity cellulitis with diabetic foot ulcers, suspect 

osteomyelitis, complicated with Charcot arthropathy


Diabetes mellitus type 2, insulin dependent


Hypertension


Hyperlipidemia


Tobacco abuse


Peripheral vascular disease


History left BKA





Plan:


Right lower extremity cellulitis with diabetic foot ulcers, suspect 

osteomyelitis, complicated with Charcot arthropathy:  Swelling to the right 

lower extremity improved.  Continue IV antibiotic therapy-vancomycin and 

cefepime.  Pharmacy to monitor and adjust appropriately.  Arterial Doppler 

shows mild PVD.  Case discussed with his podiatrist yesterday.  He will come to 

evaluate ulcers and foot tomorrow.  Await MRI tomorrow to rule out 

osteomyelitis. If the MRI negative anticipate discharge on Monday with oral 

antibiotics.  If MRI positive then the patient will require PICC line with long-

term IV antibiotic therapy.  If he requires long-term IV antibiotic therapy he 

prefers to do this at home.  Continue DVT prophylaxis.  Will provide medication 

for pain. Continue to monitor and adjust appropriately.  I will turn the 

service over to the hospitalist team tomorrow.  I will go over the plan of care 

with them.


Diabetes mellitus type 2, insulin dependent:  Will check A1c.  Continue basal 

insulin.  Will monitor and adjust appropriately.


Hypertension:  Continue with medication.  Will monitor and adjust appropriately.


Hyperlipidemia:  Continue with medication.


Tobacco abuse:  Continue to address tobacco cessation.  Will provide nicotine 

patch if needed.


Peripheral vascular disease:  Mild disease noted. No significant stenosis 

noted. Continue with aspirin.  Continue with tobacco cessation education. 


History left BKA:  Will have physical therapy ambulate.  Patient has prosthesis.


Time Spent Managing Pts Care (In Minutes): 55

## 2019-04-08 LAB
BUN BLD-MCNC: 8 MG/DL (ref 7–18)
GLUCOSE SERPLBLD-MCNC: 80 MG/DL (ref 74–106)
HCT VFR BLD CALC: 39.8 % (ref 39.6–49)
LYMPHOCYTES # SPEC AUTO: 1.6 K/UL (ref 0.7–4.9)
MAGNESIUM SERPL-MCNC: 2.1 MG/DL (ref 1.8–2.4)
PMV BLD: 7.6 FL (ref 7.6–11.3)
POTASSIUM SERPL-SCNC: 3.8 MMOL/L (ref 3.5–5.1)
RBC # BLD: 4.39 M/UL (ref 4.33–5.43)

## 2019-04-08 PROCEDURE — B548ZZA ULTRASONOGRAPHY OF SUPERIOR VENA CAVA, GUIDANCE: ICD-10-PCS

## 2019-04-08 PROCEDURE — 02HV33Z INSERTION OF INFUSION DEVICE INTO SUPERIOR VENA CAVA, PERCUTANEOUS APPROACH: ICD-10-PCS

## 2019-04-08 RX ADMIN — HUMAN INSULIN SCH: 100 INJECTION, SOLUTION SUBCUTANEOUS at 07:30

## 2019-04-08 RX ADMIN — NICOTINE SCH MG: 21 PATCH TRANSDERMAL at 09:08

## 2019-04-08 RX ADMIN — EZETIMIBE SCH MG: 10 TABLET ORAL at 09:08

## 2019-04-08 RX ADMIN — NIFEDIPINE SCH MG: 90 TABLET, FILM COATED, EXTENDED RELEASE ORAL at 09:09

## 2019-04-08 RX ADMIN — ASPIRIN SCH MG: 81 TABLET, COATED ORAL at 09:08

## 2019-04-08 RX ADMIN — SODIUM CHLORIDE SCH MLS: 9 INJECTION, SOLUTION INTRAVENOUS at 09:07

## 2019-04-08 RX ADMIN — HUMAN INSULIN SCH: 100 INJECTION, SOLUTION SUBCUTANEOUS at 16:30

## 2019-04-08 RX ADMIN — VALSARTAN SCH MG: 80 TABLET ORAL at 09:09

## 2019-04-08 RX ADMIN — GABAPENTIN SCH MG: 300 CAPSULE ORAL at 13:52

## 2019-04-08 RX ADMIN — CEFEPIME SCH MLS: 1 INJECTION, POWDER, FOR SOLUTION INTRAMUSCULAR; INTRAVENOUS at 09:07

## 2019-04-08 RX ADMIN — HUMAN INSULIN SCH: 100 INJECTION, SOLUTION SUBCUTANEOUS at 11:30

## 2019-04-08 RX ADMIN — CLOPIDOGREL BISULFATE SCH MG: 75 TABLET, FILM COATED ORAL at 09:08

## 2019-04-08 RX ADMIN — Medication SCH ML: at 09:10

## 2019-04-08 RX ADMIN — HUMAN INSULIN SCH UNITS: 100 INJECTION, SUSPENSION SUBCUTANEOUS at 20:34

## 2019-04-08 RX ADMIN — ENOXAPARIN SODIUM SCH MG: 40 INJECTION SUBCUTANEOUS at 17:10

## 2019-04-08 RX ADMIN — TRAMADOL HYDROCHLORIDE PRN MG: 50 TABLET, COATED ORAL at 09:10

## 2019-04-08 RX ADMIN — GABAPENTIN SCH MG: 300 CAPSULE ORAL at 20:33

## 2019-04-08 RX ADMIN — GABAPENTIN SCH MG: 300 CAPSULE ORAL at 09:08

## 2019-04-08 RX ADMIN — ATORVASTATIN CALCIUM SCH MG: 20 TABLET, FILM COATED ORAL at 20:33

## 2019-04-08 RX ADMIN — TRAMADOL HYDROCHLORIDE PRN MG: 50 TABLET, COATED ORAL at 20:37

## 2019-04-08 RX ADMIN — SODIUM CHLORIDE SCH MLS: 9 INJECTION, SOLUTION INTRAVENOUS at 17:10

## 2019-04-08 RX ADMIN — Medication SCH ML: at 20:34

## 2019-04-08 RX ADMIN — HUMAN INSULIN SCH UNIT: 100 INJECTION, SOLUTION SUBCUTANEOUS at 20:33

## 2019-04-08 NOTE — P.PN
Subjective


Date of Service: 04/08/19


Primary Care Provider: Dr. Kennedy([place]); Podiatry-Dr. Barber


Chief Complaint: diabetic foot ulcer infection





Patient seen and examined at bedside with RN.  Chart reviewed. Denies having 

fever, Chills, SOB and CP. MRI of the foot + for osteomyelitis. Pt voices that 

he wants to go home today. Educated on need IV abx





Review of Systems


10-point ROS is otherwise unremarkable





Physical Examination





- Vital Signs


Temperature: 97.2 F


Blood Pressure: 160/82


Pulse: 93


Respirations: 15


Pulse Ox (%): 96





- Physical Exam


General: Alert, In no apparent distress


HEENT: Atraumatic, PERRLA, EOMI


Neck: Supple, JVD not distended


Respiratory: Clear to auscultation bilaterally, Normal air movement


Cardiovascular: Regular rate/rhythm, Normal S1 S2


Gastrointestinal: Normal bowel sounds, No tenderness


Musculoskeletal: No tenderness


Integumentary: Tenderness/swelling, Erythema, Other (RLE wrapped in kurlex)


Neurological: Normal speech, Normal tone, Normal affect


Lymphatics: No axilla or inguinal lymphadenopathy





- Studies


Microbiology Data (last 24 hrs): 








04/05/19 19:45   Clean Catch Urine   [place] Count - Final


                            <10,000 CFU/ML.


04/05/19 19:45   Clean Catch Urine    - Final


04/05/19 16:00   Wound - Right Foot   Gram Stain - Final


04/05/19 16:00   Wound - Right Foot   Culture & Sensitivity - Final


                            Proteus Mirabilis





Medications List Reviewed: Yes





Assessment And Plan





- Current Problems (Diagnosis)


(1) Osteomyelitis of right lower extremity


Current Visit: Yes   Status: Acute   


Plan: 


RLE cellulites with Osteomyelitis 2.2 to Diabetic Fool ulcer  


-Podiatry Consulted. Appreciated Reccs


-Currently on IV vanc and cefepime. Will switch to Meropenem. 


-Pt will need to continue IV abx for 6 weeks total for osteomyelitis


-Wound Culture + proteus pansensitive


-PICC line placement today.


-CM placement for IV abx and Wound care 














(2) PVD (peripheral vascular disease)


Current Visit: Yes   Status: Chronic   





(3) Diabetes mellitus


Current Visit: Yes   Status: Chronic   


Plan: 


ISS and accu Check 


Qualifiers: 


   Diabetes mellitus type: type 2   Diabetes mellitus long term insulin use: 

without long term use   Diabetes mellitus complication status: with skin 

complications   Diabetes mellitus complication detail: with foot ulcer   

Qualified Code(s): E11.621 - Type 2 diabetes mellitus with foot ulcer; L97.509 

- Non-pressure chronic ulcer of other part of unspecified foot with unspecified 

severity   





(4) Dyslipidemia


Current Visit: Yes   Status: Chronic   





(5) HTN (hypertension)


Current Visit: Yes   Status: Chronic   


Qualifiers: 


   Hypertension type: essential hypertension   Qualified Code(s): I10 - 

Essential (primary) hypertension   





(6) Tobacco abuse


Current Visit: Yes   Status: Chronic   





- Plan





Pending clinical Improvement at this time. Will continue with IV abx. 


Discharge Plan: Home


Plan to discharge in: Greater than 2 days





- Code Status/Comfort Care


Code Status Assessed: Yes


Critical Care: No

## 2019-04-08 NOTE — RAD REPORT
EXAM DESCRIPTION:  MRI - Foot Right Wo Cont - 4/8/2019 11:11 am

 

CLINICAL HISTORY:  Evaluate for osteomyelitis

Soft tissue wound anterior plantar aspect of the foot.

 

COMPARISON:  Foot Right 3 View dated 4/5/2019

 

FINDINGS:  Evidence of prior amputation at the level of the first metatarsal midshaft. Previous parti
al amputation of second and third toes. Amputation at the level of the proximal fourth and fifth meta
tarsals present.

 

Soft tissue ulceration is seen along the plantar aspect of the forefoot along the medial aspect. The 
poorly defined soft tissue in this region measures up to 14 mm in thickness.

 

Residual sesamoid bone as well as bone in the region of the stump of the first metatarsal remnant mindy
ws abnormal diminished T1 signal an elevated T2/IR signal. This is compatible with mild osteomyelitis
. No fluid collection is seen to indicate abscess.

 

IMPRESSION:  Mild osteomyelitis of the residual first metatarsal stump and adjacent sesamoid bone is 
seen deep to the marked abnormal soft tissues along the plantar forefoot.

## 2019-04-09 LAB
BUN BLD-MCNC: 12 MG/DL (ref 7–18)
GLUCOSE SERPLBLD-MCNC: 104 MG/DL (ref 74–106)
HCT VFR BLD CALC: 39.6 % (ref 39.6–49)
LYMPHOCYTES # SPEC AUTO: 2 K/UL (ref 0.7–4.9)
MAGNESIUM SERPL-MCNC: 2.1 MG/DL (ref 1.8–2.4)
PMV BLD: 7.7 FL (ref 7.6–11.3)
POTASSIUM SERPL-SCNC: 4.2 MMOL/L (ref 3.5–5.1)
RBC # BLD: 4.33 M/UL (ref 4.33–5.43)

## 2019-04-09 RX ADMIN — ATORVASTATIN CALCIUM SCH MG: 20 TABLET, FILM COATED ORAL at 22:44

## 2019-04-09 RX ADMIN — TRAMADOL HYDROCHLORIDE PRN MG: 50 TABLET, COATED ORAL at 10:11

## 2019-04-09 RX ADMIN — Medication SCH ML: at 09:00

## 2019-04-09 RX ADMIN — SODIUM CHLORIDE SCH MLS: 9 INJECTION, SOLUTION INTRAVENOUS at 01:33

## 2019-04-09 RX ADMIN — HUMAN INSULIN SCH UNIT: 100 INJECTION, SOLUTION SUBCUTANEOUS at 22:46

## 2019-04-09 RX ADMIN — HUMAN INSULIN SCH: 100 INJECTION, SOLUTION SUBCUTANEOUS at 07:30

## 2019-04-09 RX ADMIN — SODIUM CHLORIDE SCH: 9 INJECTION, SOLUTION INTRAVENOUS at 09:00

## 2019-04-09 RX ADMIN — HUMAN INSULIN SCH UNITS: 100 INJECTION, SUSPENSION SUBCUTANEOUS at 22:45

## 2019-04-09 RX ADMIN — HUMAN INSULIN SCH: 100 INJECTION, SOLUTION SUBCUTANEOUS at 11:30

## 2019-04-09 RX ADMIN — SODIUM CHLORIDE SCH MLS: 9 INJECTION, SOLUTION INTRAVENOUS at 10:12

## 2019-04-09 RX ADMIN — TRAMADOL HYDROCHLORIDE PRN MG: 50 TABLET, COATED ORAL at 22:55

## 2019-04-09 RX ADMIN — EZETIMIBE SCH MG: 10 TABLET ORAL at 10:12

## 2019-04-09 RX ADMIN — HUMAN INSULIN SCH UNIT: 100 INJECTION, SOLUTION SUBCUTANEOUS at 16:30

## 2019-04-09 RX ADMIN — ENOXAPARIN SODIUM SCH: 40 INJECTION SUBCUTANEOUS at 17:00

## 2019-04-09 RX ADMIN — GABAPENTIN SCH MG: 300 CAPSULE ORAL at 10:12

## 2019-04-09 RX ADMIN — ASPIRIN SCH MG: 81 TABLET, COATED ORAL at 10:11

## 2019-04-09 RX ADMIN — GABAPENTIN SCH MG: 300 CAPSULE ORAL at 17:57

## 2019-04-09 RX ADMIN — NICOTINE SCH MG: 21 PATCH TRANSDERMAL at 10:09

## 2019-04-09 RX ADMIN — NIFEDIPINE SCH MG: 90 TABLET, FILM COATED, EXTENDED RELEASE ORAL at 10:11

## 2019-04-09 RX ADMIN — Medication SCH ML: at 22:46

## 2019-04-09 RX ADMIN — CLOPIDOGREL BISULFATE SCH MG: 75 TABLET, FILM COATED ORAL at 10:12

## 2019-04-09 RX ADMIN — GABAPENTIN SCH MG: 300 CAPSULE ORAL at 22:44

## 2019-04-09 RX ADMIN — VALSARTAN SCH MG: 80 TABLET ORAL at 10:11

## 2019-04-09 NOTE — EKG
Test Date:    2019-04-05               Test Time:    16:25:21

Technician:   STACY                                     

                                                     

MEASUREMENT RESULTS:                                       

Intervals:                                           

Rate:         103                                    

NM:           178                                    

QRSD:         100                                    

QT:           378                                    

QTc:          495                                    

Axis:                                                

P:            20                                     

NM:           178                                    

QRS:          45                                     

T:            46                                     

                                                     

INTERPRETIVE STATEMENTS:                                       

                                                     

Sinus tachycardia

Otherwise normal ECG

No previous ECG available for comparison



Electronically Signed On 04-05-19 18:21:08 CDT by Geoff Pelayo

## 2019-04-09 NOTE — P.PN
Subjective


Date of Service: 04/09/19


Primary Care Provider: Dr. Kennedy([place]); Podiatry-Dr. Barber


Chief Complaint: diabetic foot ulcer infection





Patient seen and examined at bedside with RN.  Chart reviewed. Denies having 

fever, Chills, SOB and CP. MRI of the foot + for osteomyelitis. PICC line 

placed. Pending Placement now 








Review of Systems


10-point ROS is otherwise unremarkable





Physical Examination





- Vital Signs


Temperature: 97.6 F


Blood Pressure: 142/92


Pulse: 86


Respirations: 16


Pulse Ox (%): 98





- Physical Exam


General: Alert, In no apparent distress


HEENT: Atraumatic, PERRLA, EOMI


Neck: Supple, JVD not distended


Respiratory: Clear to auscultation bilaterally, Normal air movement


Cardiovascular: Regular rate/rhythm, Normal S1 S2


Gastrointestinal: Normal bowel sounds, No tenderness


Integumentary: Skin breakdown


Neurological: Normal speech, Normal tone, Normal affect


Lymphatics: No axilla or inguinal lymphadenopathy





- Studies


Medications List Reviewed: Yes





Assessment And Plan





- Current Problems (Diagnosis)


(1) Osteomyelitis of right lower extremity


Current Visit: Yes   Status: Acute   


Plan: 


RLE cellulites with Osteomyelitis 2.2 to Diabetic Fool ulcer  


-Podiatry Consulted. Appreciated Reccs


-Now on Meropenem. Will need it for 6 weeks. 2/42


-Pt will need to continue IV abx for 6 weeks total for osteomyelitis


-Wound Culture + proteus pansensitive


-PICC line placed


-CM placement for IV abx and Wound care 














(2) PVD (peripheral vascular disease)


Current Visit: Yes   Status: Chronic   





(3) Diabetes mellitus


Current Visit: Yes   Status: Chronic   


Plan: 


ISS and accu Check 


Qualifiers: 


   Diabetes mellitus type: type 2   Diabetes mellitus long term insulin use: 

without long term use   Diabetes mellitus complication status: with skin 

complications   Diabetes mellitus complication detail: with foot ulcer   

Qualified Code(s): E11.621 - Type 2 diabetes mellitus with foot ulcer; L97.509 

- Non-pressure chronic ulcer of other part of unspecified foot with unspecified 

severity   





(4) Dyslipidemia


Current Visit: Yes   Status: Chronic   





(5) HTN (hypertension)


Current Visit: Yes   Status: Chronic   


Qualifiers: 


   Hypertension type: essential hypertension   Qualified Code(s): I10 - 

Essential (primary) hypertension   





(6) Tobacco abuse


Current Visit: Yes   Status: Chronic   





- Plan





Pending clinical Improvement at this time. Will continue with IV abx. 


Discharge Plan: Other


Plan to discharge in: Greater than 2 days





- Code Status/Comfort Care


Code Status Assessed: Yes


Critical Care: No

## 2019-04-09 NOTE — RAD REPORT
EXAM DESCRIPTION:  RAD - Chest Single View - 4/9/2019 12:40 pm

 

CLINICAL HISTORY:  Picc line placement

 

COMPARISON:  Chest Single View dated 4/5/2019

 

FINDINGS:  Portable chest was obtained following placement of a right upper extremity PICC line. The 
catheter tip projects over the SVC..

## 2019-04-09 NOTE — P.CNS
Date of Consult: 04/09/19


Primary Care Provider: Dr. Kennedy([place]); Podiatry-Dr. Barber


Chief Complaint: diabetic foot ulcer infection


Allergies





No Known Allergies Allergy (Verified 04/05/19 23:42)


 





Home Medications: 








Aspirin [Aspir-Low] 1 tab PO DAILY 04/05/19 


Atorvastatin Calcium 1 tab PO DAILY 04/05/19 


Clopidogrel Bisulfate [Plavix*] 1 tab PO DAILY 04/05/19 


Ezetimibe 1 tab PO DAILY 04/05/19 


Gabapentin 1 tab PO BID 04/05/19 


Glipizide/Metformin HCl [Glipizide-Metformin 2.5-500 mg] 1 each PO 0630 04/05/ 19 


Insulin NPH Human [Novolin N  (Humulin N)*] 20 units SQ BEDTIME 04/05/19 


Nifedipine [Nifedipine ER] 1 tab PO DAILY 04/05/19 


Olmesartan Medoxomil [Benicar] 1 tab PO DAILY 04/05/19 


Tramadol HCl [Ultram] 1 tab PO DAILY 04/05/19 


Pioglitazone [Actos] 15 mg PO DAILY 04/06/19 








- Past Medical/Surgical History


Diabetic: Yes


-: Diabetes mellitus II


-: HTN


-: dyslipidemia


-: left BKA


-: 2 toes on R foot, amputation





- Social History


Alcohol use: Yes


CD- Drugs: No


Caffeine use: Yes


Place of Residence: Home





Review of Systems


10-point ROS is otherwise unremarkable





Physical Examination














Temp Pulse Resp BP Pulse Ox


 


 97.6 F   86   16   142/92 H  98 


 


 04/09/19 12:00  04/09/19 12:00  04/09/19 12:00  04/09/19 12:00  04/09/19 12:00








General: Alert, In no apparent distress, Oriented x3


Cardiovascular: Normal pulses, Edema


Capillary refill: <2 Seconds


Musculoskeletal: No clubbing, No swelling, No contractures, No erythema, No 

tenderness, No warmth


Integumentary: Diabetic ulcer (ulceration plantar right forefoot and plantar 

proximal lateral foot with granular base.  No purulence.  distal wound probes 

to bone.  No cellulitis or streaking noted.)


Neurological: Abnormal sensation





- Problems


(1) Diabetic foot ulcer


Current Visit: Yes   Status: Acute   


Qualifiers: 


   Diabetic foot ulcer location: midfoot   Diabetes mellitus type: type 2   

Laterality: right   Non-pressure ulcer stage: with necrosis of muscle   

Qualified Code(s): E11.621 - Type 2 diabetes mellitus with foot ulcer; L97.413 

- Non-pressure chronic ulcer of right heel and midfoot with necrosis of muscle 

  


Conclusions/Impression: 





1.  I agree with long term iv antibiotics in ltac/snf setting


2.  Ange to the wound q48h


3.  Patient to reduce weightbearing to right foot


4.  Patient to follow up in wound care in one week

## 2019-04-10 RX ADMIN — SODIUM CHLORIDE SCH MLS: 9 INJECTION, SOLUTION INTRAVENOUS at 00:53

## 2019-04-10 RX ADMIN — EZETIMIBE SCH MG: 10 TABLET ORAL at 09:38

## 2019-04-10 RX ADMIN — ASPIRIN SCH MG: 81 TABLET, COATED ORAL at 09:39

## 2019-04-10 RX ADMIN — HUMAN INSULIN SCH: 100 INJECTION, SOLUTION SUBCUTANEOUS at 07:30

## 2019-04-10 RX ADMIN — Medication SCH ML: at 21:00

## 2019-04-10 RX ADMIN — Medication SCH ML: at 09:40

## 2019-04-10 RX ADMIN — SODIUM CHLORIDE SCH MLS: 9 INJECTION, SOLUTION INTRAVENOUS at 18:38

## 2019-04-10 RX ADMIN — SODIUM CHLORIDE SCH MLS: 9 INJECTION, SOLUTION INTRAVENOUS at 09:36

## 2019-04-10 RX ADMIN — HUMAN INSULIN SCH UNIT: 100 INJECTION, SOLUTION SUBCUTANEOUS at 20:56

## 2019-04-10 RX ADMIN — VALSARTAN SCH MG: 80 TABLET ORAL at 09:37

## 2019-04-10 RX ADMIN — GABAPENTIN SCH MG: 300 CAPSULE ORAL at 09:37

## 2019-04-10 RX ADMIN — HUMAN INSULIN SCH UNIT: 100 INJECTION, SOLUTION SUBCUTANEOUS at 18:34

## 2019-04-10 RX ADMIN — HUMAN INSULIN SCH UNITS: 100 INJECTION, SUSPENSION SUBCUTANEOUS at 20:59

## 2019-04-10 RX ADMIN — HUMAN INSULIN SCH: 100 INJECTION, SOLUTION SUBCUTANEOUS at 16:30

## 2019-04-10 RX ADMIN — TRAMADOL HYDROCHLORIDE PRN MG: 50 TABLET, COATED ORAL at 09:37

## 2019-04-10 RX ADMIN — NIFEDIPINE SCH MG: 90 TABLET, FILM COATED, EXTENDED RELEASE ORAL at 09:39

## 2019-04-10 RX ADMIN — TRAMADOL HYDROCHLORIDE PRN MG: 50 TABLET, COATED ORAL at 20:34

## 2019-04-10 RX ADMIN — HUMAN INSULIN SCH: 100 INJECTION, SOLUTION SUBCUTANEOUS at 11:30

## 2019-04-10 RX ADMIN — GABAPENTIN SCH MG: 300 CAPSULE ORAL at 20:34

## 2019-04-10 RX ADMIN — GABAPENTIN SCH MG: 300 CAPSULE ORAL at 14:35

## 2019-04-10 RX ADMIN — ATORVASTATIN CALCIUM SCH MG: 20 TABLET, FILM COATED ORAL at 20:34

## 2019-04-10 RX ADMIN — NICOTINE SCH MG: 21 PATCH TRANSDERMAL at 09:38

## 2019-04-10 RX ADMIN — CLOPIDOGREL BISULFATE SCH MG: 75 TABLET, FILM COATED ORAL at 09:39

## 2019-04-10 NOTE — P.DS
Admission Date: 04/05/19


Discharge Date: 04/10/19


Primary Care Provider: Dr. Kennedy([place]); Podiatry-Dr. Barber


Reason for Admission: diabetic foot ulcer infection





- Problems


(1) Osteomyelitis of right lower extremity


Current Visit: Yes   Status: Acute   





(2) PVD (peripheral vascular disease)


Current Visit: Yes   Status: Chronic   





(3) Diabetes mellitus


Current Visit: Yes   Status: Chronic   


Qualifiers: 


   Diabetes mellitus type: type 2   Diabetes mellitus long term insulin use: 

without long term use   Diabetes mellitus complication status: with skin 

complications   Diabetes mellitus complication detail: with foot ulcer   

Qualified Code(s): E11.621 - Type 2 diabetes mellitus with foot ulcer; L97.509 

- Non-pressure chronic ulcer of other part of unspecified foot with unspecified 

severity   





(4) Dyslipidemia


Current Visit: Yes   Status: Chronic   





(5) HTN (hypertension)


Current Visit: Yes   Status: Chronic   


Qualifiers: 


   Hypertension type: essential hypertension   Qualified Code(s): I10 - 

Essential (primary) hypertension   





(6) Tobacco abuse


Current Visit: Yes   Status: Chronic   


Brief History of Present Illness: 





Mr Vanegas is a 54 years old male with history of DM II, HTN, dyslipidemia, 

tobacco abuse, left BKA, partial right foot amputation, who has 2 chronic 

diabetic foot ulcers on the bottom of his right foot, followed up by Dr Barber 

every 2 weeks. However, for the last month, he was not able to see Dr Barber due 

to transportations issues. About 1 week ago, he noticed that his both ulcers, 

expanded and start having yellowish secretion. His foot is significant more 

swollen than before. He denied pain, fever or chills. Lab work in ER shows 

normal WBC count with normal Lactate and procalcitonin. XR foot shows 

significant soft tissue swelling with no definite signs of osteomyelitis. The 

patient was also complaining of back pain, which is chronic. CT lumbar spine 

shows multiple vertebral level with DJD at different levels with borderline 

central canal stenosis at L2-3.


Hospital Course: 





Overall during the hospital stay patient may stable





Patient was initially admitted to the hospital for right lower leg cellulitis.  

Podiatry was consulted who saw the patient here in the hospital and recommended 

that we start the patient on broad-spectrum antibiotics.  Patient was also 

taken down for incision drainage and clean out for the wound.  MRI of the foot 

was also done to rule out osteomyelitis.  Wound cultures were positive for 

Proteus which was sensitive to oral medication however MRI of the bone was 

positive for osteomyelitis thus the decision was made to treat the patient for 

osteomyelitis for total 6 weeks with IV meropenem.  At that time patient was 

referred over to a skilled nursing facility for long-term IV antibiotics.  

Patient was accepted at [facility] and thus was discharged there for further 

care.  Patient will be followed up with podiatry in about 1-2 days post 

discharge.


Vital Signs/Physical Exam: 














Temp Pulse Resp BP Pulse Ox


 


 97.3 F   94 H  16   140/88   98 


 


 04/10/19 12:00  04/10/19 12:00  04/10/19 12:00  04/10/19 12:00  04/10/19 12:00








General: Alert, In no apparent distress


HEENT: Atraumatic, PERRLA, EOMI


Neck: Supple, JVD not distended


Respiratory: Clear to auscultation bilaterally, Normal air movement


Cardiovascular: Regular rate/rhythm, Normal S1 S2


Gastrointestinal: Normal bowel sounds, No tenderness


Musculoskeletal: No tenderness


Integumentary: No rashes, Erythema, Warmth, Cyanosis


Neurological: Normal speech, Normal tone, Normal affect


Lymphatics: No axilla or inguinal lymphadenopathy


Laboratory Data at Discharge: 














WBC  6.9 K/uL (4.3-10.9)  D 04/09/19  04:36    


 


Hgb  13.1 g/dL (13.6-17.9)  L  04/09/19  04:36    


 


Hct  39.6 % (39.6-49.0)   04/09/19  04:36    


 


Plt Count  321 K/uL (152-406)   04/09/19  04:36    


 


PT  12.1 SECONDS (9.5-12.5)   04/05/19  16:00    


 


INR  1.03   04/05/19  16:00    


 


APTT  47.1 SECONDS (24.3-36.9)  H  04/05/19  16:00    


 


Sodium  139 mmol/L (136-145)   04/09/19  04:36    


 


Potassium  4.2 mmol/L (3.5-5.1)   04/09/19  04:36    


 


BUN  12 mg/dL (7-18)   04/09/19  04:36    


 


Creatinine  0.54 mg/dL (0.55-1.3)  L  04/09/19  04:36    


 


Glucose  104 mg/dL ()   04/09/19  04:36    


 


Magnesium  2.1 mg/dL (1.8-2.4)   04/09/19  04:36    


 


Total Bilirubin  0.2 mg/dL (0.2-1.0)   04/05/19  16:00    


 


AST  11 U/L (15-37)  L  04/05/19  16:00    


 


ALT  24 U/L (12-78)   04/05/19  16:00    


 


Alkaline Phosphatase  90 U/L ()   04/05/19  16:00    


 


Triglycerides  71 mg/dL (<150)   04/07/19  04:35    


 


Cholesterol  91 mg/dL (<200)   04/07/19  04:35    


 


HDL Cholesterol  48 mg/dL (40-60)   04/07/19  04:35    


 


Cholesterol/HDL Ratio  1.90   04/07/19  04:35    








Home Medications: 








Aspirin [Aspir-Low] 1 tab PO DAILY 04/05/19 


Atorvastatin Calcium 1 tab PO DAILY 04/05/19 


Clopidogrel Bisulfate [Plavix*] 1 tab PO DAILY 04/05/19 


Ezetimibe 1 tab PO DAILY 04/05/19 


Gabapentin 1 tab PO BID 04/05/19 


Glipizide/Metformin HCl [Glipizide-Metformin 2.5-500 mg] 1 each PO 0630 04/05/ 19 


Insulin NPH Human [Novolin N  (Humulin N)*] 20 units SQ BEDTIME 04/05/19 


Nifedipine [Nifedipine ER] 1 tab PO DAILY 04/05/19 


Olmesartan Medoxomil [Benicar] 1 tab PO DAILY 04/05/19 


Tramadol HCl [Ultram] 1 tab PO DAILY 04/05/19 


Pioglitazone [Actos] 15 mg PO DAILY 04/06/19 


Meropenem [Merrem] 1,000 mg IV Q8H 42 Days  vial 04/10/19 





New Medications: 


Meropenem [Merrem] 1,000 mg IV Q8H 42 Days  vial

## 2022-03-03 NOTE — P.HP
Certification for Inpatient


Patient admitted to: Inpatient


With expected LOS: >2 Midnights


Practitioner: I am a practitioner with admitting privileges, knowledge of 

patient current condition, hospital course, and medical plan of care.


Services: Services provided to patient in accordance with Admission 

requirements found in Title 42 Section 412.3 of the Code of Federal Regulations





Patient History


Date of Service: 04/05/19


Reason for admission: diabetic foot ulcer infection


History of Present Illness: 





Mr Vanegas is a 54 years old male with history of DM II, HTN, dyslipidemia, 

tobacco abuse, left BKA, partial right foot amputation, who has 2 chronic 

diabetic foot ulcers on the bottom of his right foot, followed up by Dr Barber 

every 2 weeks. However, for the last month, he was not able to see Dr Barber due 

to transportations issues. About 1 week ago, he noticed that his both ulcers, 

expanded and start having yellowish secretion. His foot is significant more 

swollen than before. He denied pain, fever or chills. Lab work in ER shows 

normal WBC count with normal Lactate and procalcitonin. XR foot shows 

significant soft tissue swelling with no definite signs of osteomyelitis. The 

patient was also complaining of back pain, which is chronic. CT lumbar spine 

shows multiple vertebral level with DJD at different levels with borderline 

central canal stenosis at L2-3.


Allergies





No Known Allergies Allergy (Unverified 04/05/19 19:47)


 





Home medications list reviewed: Yes





- Past Medical/Surgical History


-: Diabetes mellitus II


-: HTN


-: dyslipidemia


-: left BKA





- Family History


Family History: Reviewed- Non-Contributory





- Social History


Smoking Status: Current every day smoker


Counseled patient to stop smoking for: less than 10 minutes


Smoking therapy provided: Yes


Patient receptive to therapy: No


CD- Drugs: No


Place of Residence: Home





Review of Systems


10-point ROS is otherwise unremarkable





Physical Examination





- Physical Exam


General: Alert, In no apparent distress


HEENT: Atraumatic, PERRLA, Mucous membr. moist/pink, EOMI, Sclerae nonicteric


Neck: Supple, 2+ carotid pulse no bruit, No LAD, Without JVD or thyroid 

abnormality


Respiratory: Clear to auscultation bilaterally, Normal air movement


Cardiovascular: Regular rate/rhythm, Normal S1 S2


Gastrointestinal: Normal bowel sounds, No tenderness


Musculoskeletal: No tenderness, Other (left BKA)


Integumentary: Diabetic ulcer (2 open wounds on the bottom of his right foot.)


Neurological: Normal speech, Normal strength at 5/5 x4 extr, Normal tone, 

Normal affect


Lymphatics: No axilla or inguinal lymphadenopathy





- Studies


Laboratory Data (last 24 hrs)





04/05/19 16:00: PT 12.1, INR 1.03, APTT 47.1 H


04/05/19 16:00: WBC 10.2, Hgb 13.2 L, Hct 39.1 L, Plt Count 318


04/05/19 16:00: Sodium 138, Potassium 3.7, BUN 14, Creatinine 0.88, Glucose 236 

H, Total Bilirubin 0.2, AST 11 L, ALT 24, Alkaline Phosphatase 90








Assessment and Plan





- Problems (Diagnosis)


(1) Diabetic foot ulcer


Current Visit: Yes   Status: Acute   


Qualifiers: 


   Diabetic foot ulcer location: midfoot   Diabetes mellitus type: type 2   

Laterality: right   Non-pressure ulcer stage: with necrosis of muscle   

Qualified Code(s): E11.621 - Type 2 diabetes mellitus with foot ulcer; L97.413 

- Non-pressure chronic ulcer of right heel and midfoot with necrosis of muscle 

  





(2) Diabetes mellitus


Current Visit: Yes   Status: Acute   


Qualifiers: 


   Diabetes mellitus type: type 2   Diabetes mellitus long term insulin use: 

without long term use   Diabetes mellitus complication status: with skin 

complications   Diabetes mellitus complication detail: with foot ulcer   

Qualified Code(s): E11.621 - Type 2 diabetes mellitus with foot ulcer; L97.509 

- Non-pressure chronic ulcer of other part of unspecified foot with unspecified 

severity   





(3) Dyslipidemia


Current Visit: Yes   Status: Acute   





(4) Tobacco abuse


Current Visit: Yes   Status: Acute   





(5) HTN (hypertension)


Current Visit: Yes   Status: Acute   


Qualifiers: 


   Hypertension type: essential hypertension   Qualified Code(s): I10 - 

Essential (primary) hypertension   





- Plan





The patient will be admitted to the hospital due to worsening diabetic foot 

ulcer. No signs of systemic infection. However, due to his local worsening 

wound status, will start empiric antibiotic treatment. Consult Dr Barber, and 

wound care team. Cultures in process.





- Advance Directives


Does patient have a Living Will: No


Does patient have a Durable POA for Healthcare: No





- Code Status/Comfort Care


Code Status Assessed: Yes


Code Status: Full Code HTN / HL  Well controlled  HTN goal: <140/90.   DASH diet.   CPM (Amlodipine 2.5mg daily, HCTZ 25mg daily, Losartan 100mg daily and Verapamil 180mg daily)  CPM (Atorvastatin 40mg at bedtime)  F/u labs